# Patient Record
Sex: FEMALE | Race: WHITE | NOT HISPANIC OR LATINO
[De-identification: names, ages, dates, MRNs, and addresses within clinical notes are randomized per-mention and may not be internally consistent; named-entity substitution may affect disease eponyms.]

---

## 2021-08-09 PROBLEM — Z00.00 ENCOUNTER FOR PREVENTIVE HEALTH EXAMINATION: Status: ACTIVE | Noted: 2021-08-09

## 2021-08-13 ENCOUNTER — RESULT REVIEW (OUTPATIENT)
Age: 81
End: 2021-08-13

## 2021-08-25 DIAGNOSIS — I10 ESSENTIAL (PRIMARY) HYPERTENSION: ICD-10-CM

## 2021-08-25 DIAGNOSIS — Z87.891 PERSONAL HISTORY OF NICOTINE DEPENDENCE: ICD-10-CM

## 2021-08-25 DIAGNOSIS — E78.5 HYPERLIPIDEMIA, UNSPECIFIED: ICD-10-CM

## 2021-08-25 DIAGNOSIS — I50.32 CHRONIC DIASTOLIC (CONGESTIVE) HEART FAILURE: ICD-10-CM

## 2021-08-25 DIAGNOSIS — Z85.528 PERSONAL HISTORY OF OTHER MALIGNANT NEOPLASM OF KIDNEY: ICD-10-CM

## 2021-08-27 ENCOUNTER — APPOINTMENT (OUTPATIENT)
Dept: CARDIOTHORACIC SURGERY | Facility: CLINIC | Age: 81
End: 2021-08-27
Payer: MEDICARE

## 2021-08-27 ENCOUNTER — NON-APPOINTMENT (OUTPATIENT)
Age: 81
End: 2021-08-27

## 2021-08-27 VITALS
HEIGHT: 63 IN | WEIGHT: 216 LBS | SYSTOLIC BLOOD PRESSURE: 122 MMHG | DIASTOLIC BLOOD PRESSURE: 88 MMHG | OXYGEN SATURATION: 98 % | HEART RATE: 75 BPM | BODY MASS INDEX: 38.27 KG/M2

## 2021-08-27 PROCEDURE — 99204 OFFICE O/P NEW MOD 45 MIN: CPT

## 2021-08-27 PROCEDURE — 93000 ELECTROCARDIOGRAM COMPLETE: CPT

## 2021-08-31 NOTE — PHYSICAL EXAM
[General Appearance - Alert] : alert [General Appearance - In No Acute Distress] : in no acute distress [Sclera] : the sclera and conjunctiva were normal [Outer Ear] : the ears and nose were normal in appearance [Neck Appearance] : the appearance of the neck was normal [Jugular Venous Distention Increased] : there was no jugular-venous distention [] : no respiratory distress [Respiration, Rhythm And Depth] : normal respiratory rhythm and effort [Exaggerated Use Of Accessory Muscles For Inspiration] : no accessory muscle use [Auscultation Breath Sounds / Voice Sounds] : lungs were clear to auscultation bilaterally [Apical Impulse] : the apical impulse was normal [Heart Rate And Rhythm] : heart rate was normal and rhythm regular [Examination Of The Chest] : the chest was normal in appearance [Chest Visual Inspection Thoracic Asymmetry] : no chest asymmetry [Diminished Respiratory Excursion] : normal chest expansion [Bowel Sounds] : normal bowel sounds [Abdomen Soft] : soft [Abdomen Tenderness] : non-tender [Abnormal Walk] : normal gait [Skin Color & Pigmentation] : normal skin color and pigmentation [No Focal Deficits] : no focal deficits [Oriented To Time, Place, And Person] : oriented to person, place, and time [FreeTextEntry1] : Normal S1, diminished S2. LOYDA best heard at the [facility].

## 2021-08-31 NOTE — REVIEW OF SYSTEMS
[SOB on Exertion] : shortness of breath during exertion [Negative] : Neurological [Heart Rate Is Slow] : the heart rate was not slow [Heart Rate Is Fast] : the heart rate was not fast [Chest Pain] : no chest pain [Palpitations] : no palpitations [Leg Claudication] : no intermittent leg claudication [Lower Ext Edema] : no lower extremity edema [Shortness Of Breath] : no shortness of breath [Wheezing] : no wheezing [Cough] : no cough [Orthopnea] : no orthopnea [PND] : no PND

## 2021-08-31 NOTE — HISTORY OF PRESENT ILLNESS
[FreeTextEntry1] : \par 81 year old female who is a former smoker with a history of HTN, hyperlipidemia, prior gastric bypass, renal cell carcinoma s/p left nephrectomy and chronic diastolic heart failure with severe aortic stenosis who has been referred for surgical evaluation of her valvular heart disease. \par \par The patient reports new shortness of breath with exertion. She denies SOB at rest. The patient also denies chest pain, orthopnea, PND, dizziness, syncope, LE edema and palpitations. The patient had an ECHO in July that showed severe aortic stenosis with a mean gradient of 49 mmHg. She was then referred for cardiac catheterization that showed normal coronaries.

## 2021-09-13 ENCOUNTER — NON-APPOINTMENT (OUTPATIENT)
Age: 81
End: 2021-09-13

## 2021-09-13 ENCOUNTER — TRANSCRIPTION ENCOUNTER (OUTPATIENT)
Age: 81
End: 2021-09-13

## 2021-09-13 VITALS
SYSTOLIC BLOOD PRESSURE: 145 MMHG | HEART RATE: 87 BPM | DIASTOLIC BLOOD PRESSURE: 94 MMHG | HEIGHT: 63 IN | WEIGHT: 220.02 LBS | RESPIRATION RATE: 18 BRPM | TEMPERATURE: 98 F | OXYGEN SATURATION: 94 %

## 2021-09-14 ENCOUNTER — APPOINTMENT (OUTPATIENT)
Dept: CARDIOTHORACIC SURGERY | Facility: HOSPITAL | Age: 81
End: 2021-09-14

## 2021-09-14 ENCOUNTER — APPOINTMENT (OUTPATIENT)
Dept: CARDIOTHORACIC SURGERY | Facility: CLINIC | Age: 81
End: 2021-09-14
Payer: MEDICARE

## 2021-09-14 ENCOUNTER — NON-APPOINTMENT (OUTPATIENT)
Age: 81
End: 2021-09-14

## 2021-09-14 ENCOUNTER — INPATIENT (INPATIENT)
Facility: HOSPITAL | Age: 81
LOS: 0 days | Discharge: HOME HEALTH | DRG: 267 | End: 2021-09-15
Attending: INTERNAL MEDICINE | Admitting: INTERNAL MEDICINE
Payer: MEDICARE

## 2021-09-14 DIAGNOSIS — Z98.49 CATARACT EXTRACTION STATUS, UNSPECIFIED EYE: Chronic | ICD-10-CM

## 2021-09-14 DIAGNOSIS — Z90.5 ACQUIRED ABSENCE OF KIDNEY: Chronic | ICD-10-CM

## 2021-09-14 DIAGNOSIS — Z98.84 BARIATRIC SURGERY STATUS: Chronic | ICD-10-CM

## 2021-09-14 DIAGNOSIS — Z98.890 OTHER SPECIFIED POSTPROCEDURAL STATES: Chronic | ICD-10-CM

## 2021-09-14 DIAGNOSIS — Z96.651 PRESENCE OF RIGHT ARTIFICIAL KNEE JOINT: Chronic | ICD-10-CM

## 2021-09-14 LAB
ALBUMIN SERPL ELPH-MCNC: 3.5 G/DL — SIGNIFICANT CHANGE UP (ref 3.3–5)
ALBUMIN SERPL ELPH-MCNC: 3.7 G/DL — SIGNIFICANT CHANGE UP (ref 3.3–5)
ALBUMIN SERPL ELPH-MCNC: 4.2 G/DL — SIGNIFICANT CHANGE UP (ref 3.3–5)
ALP SERPL-CCNC: 67 U/L — SIGNIFICANT CHANGE UP (ref 40–120)
ALP SERPL-CCNC: 75 U/L — SIGNIFICANT CHANGE UP (ref 40–120)
ALP SERPL-CCNC: 85 U/L — SIGNIFICANT CHANGE UP (ref 40–120)
ALT FLD-CCNC: 14 U/L — SIGNIFICANT CHANGE UP (ref 10–45)
ALT FLD-CCNC: 18 U/L — SIGNIFICANT CHANGE UP (ref 10–45)
ALT FLD-CCNC: 20 U/L — SIGNIFICANT CHANGE UP (ref 10–45)
ANION GAP SERPL CALC-SCNC: 10 MMOL/L — SIGNIFICANT CHANGE UP (ref 5–17)
ANION GAP SERPL CALC-SCNC: 10 MMOL/L — SIGNIFICANT CHANGE UP (ref 5–17)
ANION GAP SERPL CALC-SCNC: 14 MMOL/L — SIGNIFICANT CHANGE UP (ref 5–17)
APTT BLD: 29.2 SEC — SIGNIFICANT CHANGE UP (ref 27.5–35.5)
APTT BLD: 29.5 SEC — SIGNIFICANT CHANGE UP (ref 27.5–35.5)
APTT BLD: 49.3 SEC — HIGH (ref 27.5–35.5)
AST SERPL-CCNC: 23 U/L — SIGNIFICANT CHANGE UP (ref 10–40)
AST SERPL-CCNC: 27 U/L — SIGNIFICANT CHANGE UP (ref 10–40)
AST SERPL-CCNC: 30 U/L — SIGNIFICANT CHANGE UP (ref 10–40)
BASOPHILS # BLD AUTO: 0.03 K/UL — SIGNIFICANT CHANGE UP (ref 0–0.2)
BASOPHILS NFR BLD AUTO: 0.7 % — SIGNIFICANT CHANGE UP (ref 0–2)
BILIRUB SERPL-MCNC: 0.3 MG/DL — SIGNIFICANT CHANGE UP (ref 0.2–1.2)
BILIRUB SERPL-MCNC: 0.3 MG/DL — SIGNIFICANT CHANGE UP (ref 0.2–1.2)
BILIRUB SERPL-MCNC: 0.4 MG/DL — SIGNIFICANT CHANGE UP (ref 0.2–1.2)
BLD GP AB SCN SERPL QL: POSITIVE — SIGNIFICANT CHANGE UP
BUN SERPL-MCNC: 18 MG/DL — SIGNIFICANT CHANGE UP (ref 7–23)
BUN SERPL-MCNC: 20 MG/DL — SIGNIFICANT CHANGE UP (ref 7–23)
BUN SERPL-MCNC: 20 MG/DL — SIGNIFICANT CHANGE UP (ref 7–23)
CALCIUM SERPL-MCNC: 8.7 MG/DL — SIGNIFICANT CHANGE UP (ref 8.4–10.5)
CALCIUM SERPL-MCNC: 9.3 MG/DL — SIGNIFICANT CHANGE UP (ref 8.4–10.5)
CALCIUM SERPL-MCNC: 9.9 MG/DL — SIGNIFICANT CHANGE UP (ref 8.4–10.5)
CHLORIDE SERPL-SCNC: 104 MMOL/L — SIGNIFICANT CHANGE UP (ref 96–108)
CHLORIDE SERPL-SCNC: 108 MMOL/L — SIGNIFICANT CHANGE UP (ref 96–108)
CHLORIDE SERPL-SCNC: 108 MMOL/L — SIGNIFICANT CHANGE UP (ref 96–108)
CO2 SERPL-SCNC: 23 MMOL/L — SIGNIFICANT CHANGE UP (ref 22–31)
CO2 SERPL-SCNC: 24 MMOL/L — SIGNIFICANT CHANGE UP (ref 22–31)
CO2 SERPL-SCNC: 24 MMOL/L — SIGNIFICANT CHANGE UP (ref 22–31)
CREAT SERPL-MCNC: 1.02 MG/DL — SIGNIFICANT CHANGE UP (ref 0.5–1.3)
CREAT SERPL-MCNC: 1.05 MG/DL — SIGNIFICANT CHANGE UP (ref 0.5–1.3)
CREAT SERPL-MCNC: 1.07 MG/DL — SIGNIFICANT CHANGE UP (ref 0.5–1.3)
EOSINOPHIL # BLD AUTO: 0.07 K/UL — SIGNIFICANT CHANGE UP (ref 0–0.5)
EOSINOPHIL NFR BLD AUTO: 1.7 % — SIGNIFICANT CHANGE UP (ref 0–6)
GAS PNL BLDA: SIGNIFICANT CHANGE UP
GLUCOSE BLDC GLUCOMTR-MCNC: 88 MG/DL — SIGNIFICANT CHANGE UP (ref 70–99)
GLUCOSE BLDC GLUCOMTR-MCNC: 90 MG/DL — SIGNIFICANT CHANGE UP (ref 70–99)
GLUCOSE BLDC GLUCOMTR-MCNC: 96 MG/DL — SIGNIFICANT CHANGE UP (ref 70–99)
GLUCOSE SERPL-MCNC: 104 MG/DL — HIGH (ref 70–99)
GLUCOSE SERPL-MCNC: 93 MG/DL — SIGNIFICANT CHANGE UP (ref 70–99)
GLUCOSE SERPL-MCNC: 98 MG/DL — SIGNIFICANT CHANGE UP (ref 70–99)
HCT VFR BLD CALC: 30.9 % — LOW (ref 34.5–45)
HCT VFR BLD CALC: 33.3 % — LOW (ref 34.5–45)
HCT VFR BLD CALC: 39.4 % — SIGNIFICANT CHANGE UP (ref 34.5–45)
HGB BLD-MCNC: 10.2 G/DL — LOW (ref 11.5–15.5)
HGB BLD-MCNC: 10.8 G/DL — LOW (ref 11.5–15.5)
HGB BLD-MCNC: 12.9 G/DL — SIGNIFICANT CHANGE UP (ref 11.5–15.5)
IMM GRANULOCYTES NFR BLD AUTO: 0.2 % — SIGNIFICANT CHANGE UP (ref 0–1.5)
INR BLD: 1.06 — SIGNIFICANT CHANGE UP (ref 0.88–1.16)
INR BLD: 1.11 — SIGNIFICANT CHANGE UP (ref 0.88–1.16)
INR BLD: 1.16 — SIGNIFICANT CHANGE UP (ref 0.88–1.16)
LYMPHOCYTES # BLD AUTO: 1.09 K/UL — SIGNIFICANT CHANGE UP (ref 1–3.3)
LYMPHOCYTES # BLD AUTO: 26.3 % — SIGNIFICANT CHANGE UP (ref 13–44)
MAGNESIUM SERPL-MCNC: 1.9 MG/DL — SIGNIFICANT CHANGE UP (ref 1.6–2.6)
MAGNESIUM SERPL-MCNC: 2.1 MG/DL — SIGNIFICANT CHANGE UP (ref 1.6–2.6)
MCHC RBC-ENTMCNC: 30.4 PG — SIGNIFICANT CHANGE UP (ref 27–34)
MCHC RBC-ENTMCNC: 31.3 PG — SIGNIFICANT CHANGE UP (ref 27–34)
MCHC RBC-ENTMCNC: 31.4 PG — SIGNIFICANT CHANGE UP (ref 27–34)
MCHC RBC-ENTMCNC: 32.4 GM/DL — SIGNIFICANT CHANGE UP (ref 32–36)
MCHC RBC-ENTMCNC: 32.7 GM/DL — SIGNIFICANT CHANGE UP (ref 32–36)
MCHC RBC-ENTMCNC: 33 GM/DL — SIGNIFICANT CHANGE UP (ref 32–36)
MCV RBC AUTO: 93.8 FL — SIGNIFICANT CHANGE UP (ref 80–100)
MCV RBC AUTO: 95.1 FL — SIGNIFICANT CHANGE UP (ref 80–100)
MCV RBC AUTO: 95.6 FL — SIGNIFICANT CHANGE UP (ref 80–100)
MONOCYTES # BLD AUTO: 0.28 K/UL — SIGNIFICANT CHANGE UP (ref 0–0.9)
MONOCYTES NFR BLD AUTO: 6.7 % — SIGNIFICANT CHANGE UP (ref 2–14)
NEUTROPHILS # BLD AUTO: 2.67 K/UL — SIGNIFICANT CHANGE UP (ref 1.8–7.4)
NEUTROPHILS NFR BLD AUTO: 64.4 % — SIGNIFICANT CHANGE UP (ref 43–77)
NRBC # BLD: 0 /100 WBCS — SIGNIFICANT CHANGE UP (ref 0–0)
PHOSPHATE SERPL-MCNC: 3.7 MG/DL — SIGNIFICANT CHANGE UP (ref 2.5–4.5)
PHOSPHATE SERPL-MCNC: 4 MG/DL — SIGNIFICANT CHANGE UP (ref 2.5–4.5)
PLATELET # BLD AUTO: 154 K/UL — SIGNIFICANT CHANGE UP (ref 150–400)
PLATELET # BLD AUTO: 177 K/UL — SIGNIFICANT CHANGE UP (ref 150–400)
PLATELET # BLD AUTO: 237 K/UL — SIGNIFICANT CHANGE UP (ref 150–400)
POTASSIUM SERPL-MCNC: 3.8 MMOL/L — SIGNIFICANT CHANGE UP (ref 3.5–5.3)
POTASSIUM SERPL-MCNC: 3.8 MMOL/L — SIGNIFICANT CHANGE UP (ref 3.5–5.3)
POTASSIUM SERPL-MCNC: 3.9 MMOL/L — SIGNIFICANT CHANGE UP (ref 3.5–5.3)
POTASSIUM SERPL-SCNC: 3.8 MMOL/L — SIGNIFICANT CHANGE UP (ref 3.5–5.3)
POTASSIUM SERPL-SCNC: 3.8 MMOL/L — SIGNIFICANT CHANGE UP (ref 3.5–5.3)
POTASSIUM SERPL-SCNC: 3.9 MMOL/L — SIGNIFICANT CHANGE UP (ref 3.5–5.3)
PROT SERPL-MCNC: 6 G/DL — SIGNIFICANT CHANGE UP (ref 6–8.3)
PROT SERPL-MCNC: 6.5 G/DL — SIGNIFICANT CHANGE UP (ref 6–8.3)
PROT SERPL-MCNC: 7.5 G/DL — SIGNIFICANT CHANGE UP (ref 6–8.3)
PROTHROM AB SERPL-ACNC: 12.7 SEC — SIGNIFICANT CHANGE UP (ref 10.6–13.6)
PROTHROM AB SERPL-ACNC: 13.3 SEC — SIGNIFICANT CHANGE UP (ref 10.6–13.6)
PROTHROM AB SERPL-ACNC: 13.8 SEC — HIGH (ref 10.6–13.6)
RBC # BLD: 3.25 M/UL — LOW (ref 3.8–5.2)
RBC # BLD: 3.55 M/UL — LOW (ref 3.8–5.2)
RBC # BLD: 4.12 M/UL — SIGNIFICANT CHANGE UP (ref 3.8–5.2)
RBC # FLD: 14 % — SIGNIFICANT CHANGE UP (ref 10.3–14.5)
RBC # FLD: 14.1 % — SIGNIFICANT CHANGE UP (ref 10.3–14.5)
RBC # FLD: 14.2 % — SIGNIFICANT CHANGE UP (ref 10.3–14.5)
RH IG SCN BLD-IMP: POSITIVE — SIGNIFICANT CHANGE UP
SODIUM SERPL-SCNC: 141 MMOL/L — SIGNIFICANT CHANGE UP (ref 135–145)
SODIUM SERPL-SCNC: 142 MMOL/L — SIGNIFICANT CHANGE UP (ref 135–145)
SODIUM SERPL-SCNC: 142 MMOL/L — SIGNIFICANT CHANGE UP (ref 135–145)
WBC # BLD: 4.15 K/UL — SIGNIFICANT CHANGE UP (ref 3.8–10.5)
WBC # BLD: 5.25 K/UL — SIGNIFICANT CHANGE UP (ref 3.8–10.5)
WBC # BLD: 5.29 K/UL — SIGNIFICANT CHANGE UP (ref 3.8–10.5)
WBC # FLD AUTO: 4.15 K/UL — SIGNIFICANT CHANGE UP (ref 3.8–10.5)
WBC # FLD AUTO: 5.25 K/UL — SIGNIFICANT CHANGE UP (ref 3.8–10.5)
WBC # FLD AUTO: 5.29 K/UL — SIGNIFICANT CHANGE UP (ref 3.8–10.5)

## 2021-09-14 PROCEDURE — 93308 TTE F-UP OR LMTD: CPT | Mod: 26

## 2021-09-14 PROCEDURE — 93010 ELECTROCARDIOGRAM REPORT: CPT

## 2021-09-14 PROCEDURE — 71045 X-RAY EXAM CHEST 1 VIEW: CPT | Mod: 26

## 2021-09-14 PROCEDURE — 86077 PHYS BLOOD BANK SERV XMATCH: CPT

## 2021-09-14 PROCEDURE — 99292 CRITICAL CARE ADDL 30 MIN: CPT

## 2021-09-14 PROCEDURE — 93010 ELECTROCARDIOGRAM REPORT: CPT | Mod: 77

## 2021-09-14 PROCEDURE — 33361 REPLACE AORTIC VALVE PERQ: CPT | Mod: 62,Q0

## 2021-09-14 PROCEDURE — 99291 CRITICAL CARE FIRST HOUR: CPT

## 2021-09-14 PROCEDURE — MCOT1: CPT | Mod: NC

## 2021-09-14 RX ORDER — MAGNESIUM SULFATE 500 MG/ML
2 VIAL (ML) INJECTION ONCE
Refills: 0 | Status: COMPLETED | OUTPATIENT
Start: 2021-09-14 | End: 2021-09-15

## 2021-09-14 RX ORDER — ALBUMIN HUMAN 25 %
250 VIAL (ML) INTRAVENOUS ONCE
Refills: 0 | Status: COMPLETED | OUTPATIENT
Start: 2021-09-14 | End: 2021-09-14

## 2021-09-14 RX ORDER — HEPARIN SODIUM 5000 [USP'U]/ML
7500 INJECTION INTRAVENOUS; SUBCUTANEOUS EVERY 8 HOURS
Refills: 0 | Status: DISCONTINUED | OUTPATIENT
Start: 2021-09-14 | End: 2021-09-15

## 2021-09-14 RX ORDER — DEXTROSE 50 % IN WATER 50 %
15 SYRINGE (ML) INTRAVENOUS ONCE
Refills: 0 | Status: DISCONTINUED | OUTPATIENT
Start: 2021-09-14 | End: 2021-09-15

## 2021-09-14 RX ORDER — ASPIRIN/CALCIUM CARB/MAGNESIUM 324 MG
81 TABLET ORAL DAILY
Refills: 0 | Status: DISCONTINUED | OUTPATIENT
Start: 2021-09-15 | End: 2021-09-15

## 2021-09-14 RX ORDER — DEXTROSE 50 % IN WATER 50 %
25 SYRINGE (ML) INTRAVENOUS ONCE
Refills: 0 | Status: DISCONTINUED | OUTPATIENT
Start: 2021-09-14 | End: 2021-09-15

## 2021-09-14 RX ORDER — DEXTROSE 50 % IN WATER 50 %
12.5 SYRINGE (ML) INTRAVENOUS ONCE
Refills: 0 | Status: DISCONTINUED | OUTPATIENT
Start: 2021-09-14 | End: 2021-09-15

## 2021-09-14 RX ORDER — CEFAZOLIN SODIUM 1 G
2000 VIAL (EA) INJECTION EVERY 8 HOURS
Refills: 0 | Status: DISCONTINUED | OUTPATIENT
Start: 2021-09-14 | End: 2021-09-15

## 2021-09-14 RX ORDER — POTASSIUM CHLORIDE 20 MEQ
20 PACKET (EA) ORAL ONCE
Refills: 0 | Status: COMPLETED | OUTPATIENT
Start: 2021-09-14 | End: 2021-09-14

## 2021-09-14 RX ORDER — CHLORHEXIDINE GLUCONATE 213 G/1000ML
5 SOLUTION TOPICAL
Refills: 0 | Status: DISCONTINUED | OUTPATIENT
Start: 2021-09-14 | End: 2021-09-15

## 2021-09-14 RX ORDER — SODIUM CHLORIDE 9 MG/ML
1000 INJECTION INTRAMUSCULAR; INTRAVENOUS; SUBCUTANEOUS
Refills: 0 | Status: DISCONTINUED | OUTPATIENT
Start: 2021-09-14 | End: 2021-09-15

## 2021-09-14 RX ORDER — PHENYLEPHRINE HYDROCHLORIDE 10 MG/ML
0.1 INJECTION INTRAVENOUS
Qty: 40 | Refills: 0 | Status: DISCONTINUED | OUTPATIENT
Start: 2021-09-14 | End: 2021-09-15

## 2021-09-14 RX ORDER — PANTOPRAZOLE SODIUM 20 MG/1
40 TABLET, DELAYED RELEASE ORAL DAILY
Refills: 0 | Status: DISCONTINUED | OUTPATIENT
Start: 2021-09-14 | End: 2021-09-15

## 2021-09-14 RX ORDER — SODIUM CHLORIDE 9 MG/ML
1000 INJECTION, SOLUTION INTRAVENOUS
Refills: 0 | Status: DISCONTINUED | OUTPATIENT
Start: 2021-09-14 | End: 2021-09-15

## 2021-09-14 RX ORDER — GLUCAGON INJECTION, SOLUTION 0.5 MG/.1ML
1 INJECTION, SOLUTION SUBCUTANEOUS ONCE
Refills: 0 | Status: DISCONTINUED | OUTPATIENT
Start: 2021-09-14 | End: 2021-09-15

## 2021-09-14 RX ORDER — ALBUMIN HUMAN 25 %
250 VIAL (ML) INTRAVENOUS
Refills: 0 | Status: COMPLETED | OUTPATIENT
Start: 2021-09-14 | End: 2021-09-14

## 2021-09-14 RX ORDER — INSULIN LISPRO 100/ML
VIAL (ML) SUBCUTANEOUS
Refills: 0 | Status: DISCONTINUED | OUTPATIENT
Start: 2021-09-14 | End: 2021-09-15

## 2021-09-14 RX ORDER — HEPARIN SODIUM 5000 [USP'U]/ML
5000 INJECTION INTRAVENOUS; SUBCUTANEOUS EVERY 8 HOURS
Refills: 0 | Status: DISCONTINUED | OUTPATIENT
Start: 2021-09-14 | End: 2021-09-14

## 2021-09-14 RX ORDER — ACETAMINOPHEN 500 MG
650 TABLET ORAL EVERY 6 HOURS
Refills: 0 | Status: DISCONTINUED | OUTPATIENT
Start: 2021-09-14 | End: 2021-09-15

## 2021-09-14 RX ORDER — MEPERIDINE HYDROCHLORIDE 50 MG/ML
25 INJECTION INTRAMUSCULAR; INTRAVENOUS; SUBCUTANEOUS ONCE
Refills: 0 | Status: DISCONTINUED | OUTPATIENT
Start: 2021-09-14 | End: 2021-09-14

## 2021-09-14 RX ADMIN — Medication 100 MILLIEQUIVALENT(S): at 16:11

## 2021-09-14 RX ADMIN — Medication 125 MILLILITER(S): at 19:20

## 2021-09-14 RX ADMIN — SODIUM CHLORIDE 10 MILLILITER(S): 9 INJECTION INTRAMUSCULAR; INTRAVENOUS; SUBCUTANEOUS at 14:15

## 2021-09-14 RX ADMIN — HEPARIN SODIUM 7500 UNIT(S): 5000 INJECTION INTRAVENOUS; SUBCUTANEOUS at 21:48

## 2021-09-14 RX ADMIN — Medication 650 MILLIGRAM(S): at 21:48

## 2021-09-14 RX ADMIN — Medication 250 MILLILITER(S): at 23:48

## 2021-09-14 RX ADMIN — Medication 125 MILLILITER(S): at 19:51

## 2021-09-14 RX ADMIN — PHENYLEPHRINE HYDROCHLORIDE 3.74 MICROGRAM(S)/KG/MIN: 10 INJECTION INTRAVENOUS at 23:50

## 2021-09-14 RX ADMIN — Medication 250 MILLILITER(S): at 23:51

## 2021-09-14 RX ADMIN — Medication 100 MILLIGRAM(S): at 20:10

## 2021-09-14 NOTE — H&P ADULT - ASSESSMENT
This 81 year -old female with PMH significant for HNT, HLD, prior gastric bypass, renal cell carcinoma with s/p left nephrectomy and chornic diastoilc CHF with severe aortic stenosis with associated symptoms of decrease of daily activities and shortness of breath presented to this admission for intervention for her valvular disease.  In the meantime, patient reports gradually decrease of activities, she denies fatigues, malaise, weight changes, syncopes, chest pain, shortness of breath, orthopnea, or cough.       Neurovascular: No delirium  - neurologic check per routine     Cardiovascular: aortic stenosis   - TAVR this admission   - pre op EKG  - ASA 81mg prior to the procedures  - pre op labs    Respiratory: 02 Sat = 98% on RA.  -If on oxygen wean to RA from for O2 Sat > 93%.  -Encourage C+DB and Use of IS 10x / hr while awake.  -CXR.    GI: s/p gastric bypass stable   - resume oral intake post op   - PPX.    Renal / : stable with Cr 1.06/BUN 20  -Monitor renal function.  -Monitor I/O's.    Endocrine:  stable  - monitor for A1c and TSH.    Hematologic: stable with H/H 12.9/39.4  - type and screen and hold blood products for procedures  - pre op lab    ID: afebrile with WBC of 5.29  -Tempature.  -CBC.  -Observe for SIRS/Sepsis Syndrome.    Prophylaxis:  - intraop heparin therapy   - post op DVT prophylaxis with 5000 SubQ Heparin q8h.  - post op SCD's    Disposition:  - TAVR at this admission   - post op cardiac step down for further care

## 2021-09-14 NOTE — H&P ADULT - NSICDXPASTMEDICALHX_GEN_ALL_CORE_FT
PAST MEDICAL HISTORY:  Diastolic CHF, acute     HLD (hyperlipidemia)     HTN (hypertension)     Severe aortic stenosis      PAST MEDICAL HISTORY:  Diastolic CHF, acute     HLD (hyperlipidemia)     HTN (hypertension)     Renal cell carcinoma     Severe aortic stenosis

## 2021-09-14 NOTE — H&P ADULT - NSHPLABSRESULTS_GEN_ALL_CORE
LABS:                        12.9   5.29  )-----------( 237      ( 14 Sep 2021 09:33 )             39.4     09-14    141  |  104  |  20  ----------------------------<  104<H>  3.8   |  23  |  1.05    Ca    9.3      14 Sep 2021 09:33  Phos  4.0     09-14  Mg     2.1     09-14    TPro  7.5  /  Alb  4.2  /  TBili  0.4  /  DBili  x   /  AST  27  /  ALT  20  /  AlkPhos  85  09-14    PT/INR - ( 14 Sep 2021 09:33 )   PT: 12.7 sec;   INR: 1.06          PTT - ( 14 Sep 2021 09:33 )  PTT:29.2 sec            RADIOLOGY & ADDITIONAL STUDIES:  CAROTID U/S:    CXR:    CT Scan:    EKG:    TTE / YUAN:    Cardiac Cath: LABS:                        12.9   5.29  )-----------( 237      ( 14 Sep 2021 09:33 )             39.4     09-14    141  |  104  |  20  ----------------------------<  104<H>  3.8   |  23  |  1.05    Ca    9.3      14 Sep 2021 09:33  Phos  4.0     09-14  Mg     2.1     09-14    TPro  7.5  /  Alb  4.2  /  TBili  0.4  /  DBili  x   /  AST  27  /  ALT  20  /  AlkPhos  85  09-14    PT/INR - ( 14 Sep 2021 09:33 )   PT: 12.7 sec;   INR: 1.06          PTT - ( 14 Sep 2021 09:33 )  PTT:29.2 sec    RADIOLOGY & ADDITIONAL STUDIES:  EKG: performed at bedside   echocardiogram: report in the cart and allscript   CT scan:

## 2021-09-14 NOTE — H&P ADULT - HISTORY OF PRESENT ILLNESS
Surgeon:    Requesting Physician:    HISTORY OF PRESENT ILLNESS:  81y Female    PAST MEDICAL & SURGICAL HISTORY:      MEDICATIONS  (STANDING):    MEDICATIONS  (PRN):      Allergies    desmopressin (Unknown)  imipramine (Unknown)  quinapril (Short breath (Mild))    Intolerances        SOCIAL HISTORY:  Smoker:  YES / NO        PACK YEARS:                         WHEN QUIT?  ETOH use:  YES / NO               FREQUENCY / QUANTITY:  Ilicit Drug use:  YES / NO  Occupation:  Assisted device use (Cane / Walker):  Live with:    FAMILY HISTORY:      Review of Systems  CONSTITUTIONAL:  Fevers / chills, sweats, fatigue, weight loss, weight gain                                    NEGATIVE  NEURO:  parathesias, seizures, syncope, confusion                                                                               NEGATIVE  EYES:  Blurry vision, discharge, pain, loss of vision                                                                                  NEGATIVE  ENMT:  Difficulty hearing, vertigo, dysphagia, epistaxis, recent dental work                                     NEGATIVE  CV:  Chest pain, palpitations, BARNES, orthopnea                                                                                         NEGATIVE  RESPIRATORY:  Wheezing, SOB, cough / sputum, hemoptysis                                                              NEGATIVE  GI:  Nausea, vommiting, diarrhea, constipation, melena                                                                        NEGATIVE  : Hematuria, dysuria, urgency, incontinence                                                                                       NEGATIVE  MUSKULOSKELETAL:  arthritis, joint swelling, muscle weakness                                                           NEGATIVE  SKIN/BREAST:  rash, itching, treva loss, masses                                                                                            NEGATIVE  PSYCH:  depresion, anxiety, suicidal ideation                                                                                             NEGATIVE  HEME/LYMPH:  bruises easily, enlarged lymph nodes, tender lymph nodes                                        NEGATIVE  ENDOCRINE:  cold intolerance, heat intolerance, polydipsia                                                                   NEGATIVE    PHYSICAL EXAM  Vital Signs Last 24 Hrs  T(C): 36.7 (13 Sep 2021 12:08), Max: 36.7 (13 Sep 2021 12:08)  T(F): 98.1 (13 Sep 2021 12:08), Max: 98.1 (13 Sep 2021 12:08)  HR: 87 (13 Sep 2021 12:08) (87 - 87)  BP: 145/94 (13 Sep 2021 12:08) (145/94 - 145/94)  BP(mean): --  RR: 18 (13 Sep 2021 12:08) (18 - 18)  SpO2: 94% (13 Sep 2021 12:08) (94% - 94%)    CONSTITUTIONAL:                                                                          WNL  NEURO:                                                                                             WNL                      EYES:                                                                                                  WNL  ENMT:                                                                                                WNL  CV:                                                                                                      WNL  RESPIRATORY:                                                                                  WNL  GI:                                                                                                       WNL  : SANDERS + / -                                                                                 WNL  MUSKULOSKELETAL:                                                                       WNL  SKIN / BREAST:                                                                                 WNL                                                          LABS:                        12.9   5.29  )-----------( 237      ( 14 Sep 2021 09:33 )             39.4     09-14    141  |  104  |  20  ----------------------------<  104<H>  3.8   |  23  |  1.05    Ca    9.3      14 Sep 2021 09:33  Phos  4.0     09-14  Mg     2.1     09-14    TPro  7.5  /  Alb  4.2  /  TBili  0.4  /  DBili  x   /  AST  27  /  ALT  20  /  AlkPhos  85  09-14    PT/INR - ( 14 Sep 2021 09:33 )   PT: 12.7 sec;   INR: 1.06          PTT - ( 14 Sep 2021 09:33 )  PTT:29.2 sec            RADIOLOGY & ADDITIONAL STUDIES:  CAROTID U/S:    CXR:    CT Scan:    EKG:    TTE / YUAN:    Cardiac Cath: This 81 year -old female with PMH significant for HNT, HLD, prior gastric bypass, renal cell carcinoma with s/p left nephrectomy and chornic diastoilc CHF with severe aortic stenosis with associated symptoms of decrease of daily activities and shortness of breath presented to this admission for intervention for her valvular disease.                                                                    This 81 year -old female with PMH significant for HNT, HLD, prior gastric bypass, renal cell carcinoma with s/p left nephrectomy and chornic diastoilc CHF with severe aortic stenosis with associated symptoms of decrease of daily activities and shortness of breath presented to this admission for intervention for her valvular disease.  In the meantime, patient reports gradually decrease of activities, she denies fatigues, malaise, weight changes, syncopes, chest pain, shortness of breath, orthopnea, or cough.       Upon arrival, patient is not in acute distress.  She is hemodynamically stable; she states LE edema is her normal state and she denies syncopes, headache, palpitation, chest pain, orthopnea or shortness of breath.

## 2021-09-14 NOTE — H&P ADULT - NSHPREVIEWOFSYSTEMS_GEN_ALL_CORE
CONSTITUTIONAL:  Fevers / chills, sweats, fatigue, weight loss, weight gain                                     NEURO:  parathesias, seizures, syncope, confusion                                                                                 EYES:  Blurry vision, discharge, pain, loss of vision                                                                                    ENMT:  Difficulty hearing, vertigo, dysphagia, epistaxis, recent dental work                                      CV:  Chest pain, palpitations, BARNES, orthopnea                                                                                           RESPIRATORY:  Wheezing, SOB, cough / sputum, hemoptysis                                                               GI:  Nausea, vommiting, diarrhea, constipation, melena                                                                         : Hematuria, dysuria, urgency, incontinence                                                                                         MUSKULOSKELETAL:  arthritis, joint swelling, muscle weakness                                                            SKIN/BREAST:  rash, itching, treva loss, masses                                                                                          PSYCH:  depresion, anxiety, suicidal ideation                                                                                               HEME/LYMPH:  bruises easily, enlarged lymph nodes, tender lymph nodes                                         ENDOCRINE:  cold intolerance, heat intolerance, polydipsia CONSTITUTIONAL: denies Fevers / chills, sweats, fatigue, weight loss, weight gain                                     NEURO: denies parathesias, seizures, syncope, confusion                                                                                 EYES: denies Blurry vision, discharge, pain, loss of vision                                                                                    ENMT: denies Difficulty hearing, vertigo, dysphagia, epistaxis, recent dental work                                      CV: positive for decrease of daily activities denies Chest pain, palpitations, BARNES, orthopnea                                                                                           RESPIRATORY: positive for SOB denies Wheezing, cough / sputum, hemoptysis                                                               GI: denies Nausea, vomiting, diarrhea, constipation, melena                                                                         : denies Hematuria, dysuria, urgency, incontinence                                                                                         MUSKULOSKELETAL: denies arthritis, joint swelling, muscle weakness                                                            SKIN/BREAST: denies rash, itching, hair loss, masses                                                                                          PSYCH: denies depression, anxiety, suicidal ideation                                                                                               HEME/LYMPH: denies bruises easily, enlarged lymph nodes, tender lymph nodes                                         ENDOCRINE: denies cold intolerance, heat intolerance, polydipsia

## 2021-09-14 NOTE — H&P ADULT - NSICDXPASTSURGICALHX_GEN_ALL_CORE_FT
PAST SURGICAL HISTORY:  S/P cataract surgery     S/P gastric bypass     S/P hernia repair     S/p nephrectomy     S/P total knee replacement, right

## 2021-09-14 NOTE — BRIEF OPERATIVE NOTE - COMMENTS
Dr. Zuniga was the first assistant for this case including but not limited to TAVR     I was present for this procedure and participated as first assistant as described by the PA above, unless otherwise noted below.

## 2021-09-14 NOTE — H&P ADULT - NSHPSOCIALHISTORY_GEN_ALL_CORE
Smoker: former smoker  ETOH use: no  Ilicit Drug use:  no  Occupation: retired/noncontributory   Assisted device use (Cane / Walker): n/a  Live with:

## 2021-09-14 NOTE — H&P ADULT - NSHPPHYSICALEXAM_GEN_ALL_CORE
T(C): 36.7 (13 Sep 2021 12:08), Max: 36.7 (13 Sep 2021 12:08)  T(F): 98.1 (13 Sep 2021 12:08), Max: 98.1 (13 Sep 2021 12:08)  HR: 87 (13 Sep 2021 12:08) (87 - 87)  BP: 145/94 (13 Sep 2021 12:08) (145/94 - 145/94)  BP(mean): --  RR: 18 (13 Sep 2021 12:08) (18 - 18)  SpO2: 94% (13 Sep 2021 12:08) (94% - 94%)    CONSTITUTIONAL:                                                                          WNL  NEURO:                                                                                             WNL                      EYES:                                                                                                  WNL  ENMT:                                                                                                WNL  CV:                                                                                                      WNL  RESPIRATORY:                                                                                  WNL  GI:                                                                                                       WNL  : SANDERS + / -                                                                                 WNL  MUSKULOSKELETAL:                                                                       WNL  SKIN / BREAST:                                                                                 WNL T(C): 36.7 (13 Sep 2021 12:08), Max: 36.7 (13 Sep 2021 12:08)  T(F): 98.1 (13 Sep 2021 12:08), Max: 98.1 (13 Sep 2021 12:08)  HR: 87 (13 Sep 2021 12:08) (87 - 87)  BP: 145/94 (13 Sep 2021 12:08) (145/94 - 145/94)  BP(mean): --  RR: 18 (13 Sep 2021 12:08) (18 - 18)  SpO2: 94% (13 Sep 2021 12:08) (94% - 94%)    CONSTITUTIONAL:   overweiht  NAD                                                                      NEURO:     grossly intact                                                                                                             EYES:       PERRL                                                                                             ENMT:        supple and no lymphadenopathy                                                                                           CV:    RRR with positive for murmur                                                                                                    RESPIRATORY:      no wheezing and no rhonchi                                                                              GI:    BM and BS are intact                                                                                                     : deferred                                                                                MUSKULOSKELETAL:     no deformity                                                                    SKIN / BREAST: warm

## 2021-09-15 ENCOUNTER — TRANSCRIPTION ENCOUNTER (OUTPATIENT)
Age: 81
End: 2021-09-15

## 2021-09-15 VITALS — TEMPERATURE: 99 F

## 2021-09-15 LAB
ALBUMIN SERPL ELPH-MCNC: 3.8 G/DL — SIGNIFICANT CHANGE UP (ref 3.3–5)
ALP SERPL-CCNC: 64 U/L — SIGNIFICANT CHANGE UP (ref 40–120)
ALT FLD-CCNC: 14 U/L — SIGNIFICANT CHANGE UP (ref 10–45)
ANION GAP SERPL CALC-SCNC: 10 MMOL/L — SIGNIFICANT CHANGE UP (ref 5–17)
APTT BLD: 29.7 SEC — SIGNIFICANT CHANGE UP (ref 27.5–35.5)
AST SERPL-CCNC: 23 U/L — SIGNIFICANT CHANGE UP (ref 10–40)
BILIRUB SERPL-MCNC: 0.4 MG/DL — SIGNIFICANT CHANGE UP (ref 0.2–1.2)
BUN SERPL-MCNC: 17 MG/DL — SIGNIFICANT CHANGE UP (ref 7–23)
CALCIUM SERPL-MCNC: 8.6 MG/DL — SIGNIFICANT CHANGE UP (ref 8.4–10.5)
CHLORIDE SERPL-SCNC: 110 MMOL/L — HIGH (ref 96–108)
CO2 SERPL-SCNC: 24 MMOL/L — SIGNIFICANT CHANGE UP (ref 22–31)
CREAT SERPL-MCNC: 1.05 MG/DL — SIGNIFICANT CHANGE UP (ref 0.5–1.3)
GAS PNL BLDA: SIGNIFICANT CHANGE UP
GLUCOSE BLDC GLUCOMTR-MCNC: 100 MG/DL — HIGH (ref 70–99)
GLUCOSE SERPL-MCNC: 103 MG/DL — HIGH (ref 70–99)
HCT VFR BLD CALC: 30.6 % — LOW (ref 34.5–45)
HGB BLD-MCNC: 10 G/DL — LOW (ref 11.5–15.5)
INR BLD: 1.1 — SIGNIFICANT CHANGE UP (ref 0.88–1.16)
MAGNESIUM SERPL-MCNC: 2.4 MG/DL — SIGNIFICANT CHANGE UP (ref 1.6–2.6)
MCHC RBC-ENTMCNC: 31 PG — SIGNIFICANT CHANGE UP (ref 27–34)
MCHC RBC-ENTMCNC: 32.7 GM/DL — SIGNIFICANT CHANGE UP (ref 32–36)
MCV RBC AUTO: 94.7 FL — SIGNIFICANT CHANGE UP (ref 80–100)
NRBC # BLD: 0 /100 WBCS — SIGNIFICANT CHANGE UP (ref 0–0)
PHOSPHATE SERPL-MCNC: 4 MG/DL — SIGNIFICANT CHANGE UP (ref 2.5–4.5)
PLATELET # BLD AUTO: 179 K/UL — SIGNIFICANT CHANGE UP (ref 150–400)
POTASSIUM SERPL-MCNC: 3.9 MMOL/L — SIGNIFICANT CHANGE UP (ref 3.5–5.3)
POTASSIUM SERPL-SCNC: 3.9 MMOL/L — SIGNIFICANT CHANGE UP (ref 3.5–5.3)
PROT SERPL-MCNC: 6.2 G/DL — SIGNIFICANT CHANGE UP (ref 6–8.3)
PROTHROM AB SERPL-ACNC: 13.1 SEC — SIGNIFICANT CHANGE UP (ref 10.6–13.6)
RBC # BLD: 3.23 M/UL — LOW (ref 3.8–5.2)
RBC # FLD: 14 % — SIGNIFICANT CHANGE UP (ref 10.3–14.5)
SODIUM SERPL-SCNC: 144 MMOL/L — SIGNIFICANT CHANGE UP (ref 135–145)
WBC # BLD: 6.4 K/UL — SIGNIFICANT CHANGE UP (ref 3.8–10.5)
WBC # FLD AUTO: 6.4 K/UL — SIGNIFICANT CHANGE UP (ref 3.8–10.5)

## 2021-09-15 PROCEDURE — 71045 X-RAY EXAM CHEST 1 VIEW: CPT | Mod: 26

## 2021-09-15 PROCEDURE — 99222 1ST HOSP IP/OBS MODERATE 55: CPT

## 2021-09-15 PROCEDURE — 93306 TTE W/DOPPLER COMPLETE: CPT | Mod: 26

## 2021-09-15 PROCEDURE — 93010 ELECTROCARDIOGRAM REPORT: CPT

## 2021-09-15 RX ORDER — POTASSIUM CHLORIDE 20 MEQ
20 PACKET (EA) ORAL ONCE
Refills: 0 | Status: COMPLETED | OUTPATIENT
Start: 2021-09-15 | End: 2021-09-15

## 2021-09-15 RX ORDER — TRIAMTERENE/HYDROCHLOROTHIAZID 75 MG-50MG
1 TABLET ORAL
Qty: 0 | Refills: 0 | DISCHARGE

## 2021-09-15 RX ORDER — ALBUMIN HUMAN 25 %
250 VIAL (ML) INTRAVENOUS ONCE
Refills: 0 | Status: COMPLETED | OUTPATIENT
Start: 2021-09-15 | End: 2021-09-15

## 2021-09-15 RX ORDER — ASPIRIN/CALCIUM CARB/MAGNESIUM 324 MG
1 TABLET ORAL
Qty: 30 | Refills: 0
Start: 2021-09-15 | End: 2021-10-14

## 2021-09-15 RX ORDER — ASPIRIN/CALCIUM CARB/MAGNESIUM 324 MG
1 TABLET ORAL
Qty: 0 | Refills: 0 | DISCHARGE

## 2021-09-15 RX ORDER — ACETAMINOPHEN 500 MG
2 TABLET ORAL
Qty: 0 | Refills: 0 | DISCHARGE
Start: 2021-09-15

## 2021-09-15 RX ADMIN — PANTOPRAZOLE SODIUM 40 MILLIGRAM(S): 20 TABLET, DELAYED RELEASE ORAL at 11:48

## 2021-09-15 RX ADMIN — Medication 50 GRAM(S): at 00:28

## 2021-09-15 RX ADMIN — Medication 100 MILLIEQUIVALENT(S): at 06:42

## 2021-09-15 RX ADMIN — Medication 650 MILLIGRAM(S): at 03:50

## 2021-09-15 RX ADMIN — HEPARIN SODIUM 7500 UNIT(S): 5000 INJECTION INTRAVENOUS; SUBCUTANEOUS at 06:33

## 2021-09-15 RX ADMIN — Medication 650 MILLIGRAM(S): at 06:29

## 2021-09-15 RX ADMIN — Medication 100 MILLIGRAM(S): at 05:00

## 2021-09-15 RX ADMIN — Medication 100 MILLIGRAM(S): at 11:48

## 2021-09-15 RX ADMIN — CHLORHEXIDINE GLUCONATE 5 MILLILITER(S): 213 SOLUTION TOPICAL at 06:32

## 2021-09-15 RX ADMIN — Medication 100 MILLIEQUIVALENT(S): at 00:28

## 2021-09-15 RX ADMIN — PHENYLEPHRINE HYDROCHLORIDE 3.74 MICROGRAM(S)/KG/MIN: 10 INJECTION INTRAVENOUS at 11:54

## 2021-09-15 RX ADMIN — Medication 125 MILLILITER(S): at 09:47

## 2021-09-15 RX ADMIN — Medication 650 MILLIGRAM(S): at 01:00

## 2021-09-15 RX ADMIN — Medication 125 MILLILITER(S): at 11:11

## 2021-09-15 RX ADMIN — HEPARIN SODIUM 7500 UNIT(S): 5000 INJECTION INTRAVENOUS; SUBCUTANEOUS at 14:59

## 2021-09-15 RX ADMIN — Medication 81 MILLIGRAM(S): at 11:48

## 2021-09-15 NOTE — DISCHARGE NOTE PROVIDER - HOSPITAL COURSE
80 y/o F with PMHx significant for HTN, HLD, prior gastric bypass, renal cell carcinoma with (s/p left nephrectomy) and chronic diastolic CHF with severe aortic stenosis with associated symptoms of decrease of daily activities and shortness of breath presented to this admission for intervention for her aortic valve disease. Patient presented today for TAVR. Her intraoperative course was uncomplicated and she was transferred directly to [address] [place] [address] ICU with TVP in place. Pt requiring intermittent low dose ranjith overnight into POD1 for hypotension, asymptomatic. POD1 remained with TVP in place in the morning for sinus bradycardia. EP consulted and cleared patient for discharge. TVP removed from the groin, no issues, sat up no issues. Ambulated no issues. Per Dr. Aleman, Dr. Zuniga and Dr. Bailey, pt is medically ready to be discharged home.     Over 35 minutes was spent with the patient reviewing the discharge material including medications, follow up appointments, recovery, concerning symptoms, and how to contact their health care providers if they have questions    Of note, pt will be discharged on ASA only

## 2021-09-15 NOTE — PROGRESS NOTE ADULT - SUBJECTIVE AND OBJECTIVE BOX
CTICU  CRITICAL  CARE  attending     Hand off received 					   Pertinent clinical, laboratory, radiographic, hemodynamic, echocardiographic, respiratory data, microbiologic data and chart were reviewed and analyzed frequently throughout the course of the day and night    This 81 year -old female with PMH significant for HNT, HLD, prior gastric bypass, renal cell carcinoma with s/p left nephrectomy and chronic  diastolic  CHF with severe aortic stenosis with associated symptoms of decrease of daily activities and shortness of breath presented to this admission for intervention for her valvular disease.  In the meantime, patient reports gradually decrease of activities, she denies fatigues, malaise, weight changes, syncopes, chest pain, shortness of breath, orthopnea, or cough.       S/P TAVR      FAMILY HISTORY:  PAST MEDICAL & SURGICAL HISTORY:  HTN (hypertension)    HLD (hyperlipidemia)    Diastolic CHF, acute    Severe aortic stenosis    Renal cell carcinoma    S/P cataract surgery    S/P gastric bypass    S/P hernia repair    S/P total knee replacement, right    S/p nephrectomy         14 system review was unremarkable    Vital signs, hemodynamic and respiratory parameters were reviewed from the bedside nursing flow sheet.  ICU Vital Signs Last 24 Hrs  T(C): 36.3 (14 Sep 2021 13:45), Max: 36.3 (14 Sep 2021 13:45)  T(F): 97.3 (14 Sep 2021 13:45), Max: 97.3 (14 Sep 2021 13:45)  HR: 80 (14 Sep 2021 21:00) (59 - 80)  BP: 127/56 (14 Sep 2021 21:00) (100/53 - 128/56)  BP(mean): 81 (14 Sep 2021 21:00) (72 - 81)  ABP: 100/42 (14 Sep 2021 21:00) (83/36 - 137/61)  ABP(mean): 61 (14 Sep 2021 21:00) (52 - 88)  RR: 20 (14 Sep 2021 20:55) (16 - 20)  SpO2: 94% (14 Sep 2021 21:00) (92% - 100%)    Adult Advanced Hemodynamics Last 24 Hrs  CVP(mm Hg): --  CVP(cm H2O): --  CO: --  CI: --  PA: --  PA(mean): --  PCWP: --  SVR: --  SVRI: --  PVR: --  PVRI: --, ABG - ( 14 Sep 2021 23:30 )  pH, Arterial: 7.43  pH, Blood: x     /  pCO2: 36    /  pO2: 82    / HCO3: 24    / Base Excess: -0.1  /  SaO2: 97.8                Intake and output was reviewed and the fluid balance was calculated  Daily     Daily   I&O's Summary    14 Sep 2021 07:01  -  14 Sep 2021 23:55  --------------------------------------------------------  IN: 710 mL / OUT: 500 mL / NET: 210 mL        All lines and drain sites were assessed    Neuro: No change in the mental status from the baseline. Moves all 4 extremities.  Neck: No JVD.  CVS: S1, S2, No S3.  Lungs: Good air entry bilaterally.  Abd: Soft. No tenderness. + Bowel sounds.  Vascular: + DP/PT.  Extremities: Lowe Extremity  edema.  Lymphatic: Normal.  Skin: No abnormalities.      labs  CBC Full  -  ( 14 Sep 2021 23:16 )  WBC Count : 5.25 K/uL  RBC Count : 3.25 M/uL  Hemoglobin : 10.2 g/dL  Hematocrit : 30.9 %  Platelet Count - Automated : 154 K/uL  Mean Cell Volume : 95.1 fl  Mean Cell Hemoglobin : 31.4 pg  Mean Cell Hemoglobin Concentration : 33.0 gm/dL  Auto Neutrophil # : x  Auto Lymphocyte # : x  Auto Monocyte # : x  Auto Eosinophil # : x  Auto Basophil # : x  Auto Neutrophil % : x  Auto Lymphocyte % : x  Auto Monocyte % : x  Auto Eosinophil % : x  Auto Basophil % : x    09-14    142  |  108  |  20  ----------------------------<  93  3.9   |  24  |  1.07    Ca    8.7      14 Sep 2021 23:16  Phos  3.7     09-14  Mg     1.9     09-14    TPro  6.0  /  Alb  3.5  /  TBili  0.3  /  DBili  x   /  AST  23  /  ALT  14  /  AlkPhos  67  09-14    PT/INR - ( 14 Sep 2021 23:16 )   PT: 13.3 sec;   INR: 1.11          PTT - ( 14 Sep 2021 23:16 )  PTT:29.5 sec  The current medications were reviewed   MEDICATIONS  (STANDING):  ceFAZolin   IVPB 2000 milliGRAM(s) IV Intermittent every 8 hours  chlorhexidine 0.12% Liquid 5 milliLiter(s) Oral Mucosa two times a day  dextrose 40% Gel 15 Gram(s) Oral once  dextrose 5%. 1000 milliLiter(s) (50 mL/Hr) IV Continuous <Continuous>  dextrose 5%. 1000 milliLiter(s) (100 mL/Hr) IV Continuous <Continuous>  dextrose 50% Injectable 25 Gram(s) IV Push once  dextrose 50% Injectable 12.5 Gram(s) IV Push once  dextrose 50% Injectable 25 Gram(s) IV Push once  glucagon  Injectable 1 milliGRAM(s) IntraMuscular once  heparin   Injectable 7500 Unit(s) SubCutaneous every 8 hours  insulin lispro (ADMELOG) corrective regimen sliding scale   SubCutaneous Before meals and at bedtime  magnesium sulfate  IVPB 2 Gram(s) IV Intermittent once  pantoprazole  Injectable 40 milliGRAM(s) IV Push daily  phenylephrine    Infusion 0.1 MICROgram(s)/kG/Min (3.74 mL/Hr) IV Continuous <Continuous>  potassium chloride  20 mEq/100 mL IVPB 20 milliEquivalent(s) IV Intermittent once  sodium chloride 0.9%. 1000 milliLiter(s) (10 mL/Hr) IV Continuous <Continuous>    MEDICATIONS  (PRN):  acetaminophen   Tablet .. 650 milliGRAM(s) Oral every 6 hours PRN Mild Pain (1 - 3)        S/P TAVR  Second degree AV block  VVI pacing at 80 BPM.  Hemodynamically stable.  Good oxygenation.  Fair urine out put.        My plan includes :  Schedule for permanent pacemaker.   Statin Rx.  PO afterload reduction.  Hold Betablocker.  Close hemodynamic, ventilatory and drain monitoring and management  Monitor for arrhythmias and monitor parameters for organ perfusion  Monitor neurologic status  Monitor renal function.  Head of the bed should remain elevated to 45 deg .   Chest PT and IS will be encouraged  Monitor adequacy of oxygenation and ventilation and attempt to wean oxygen  Nutritional goals will be met using po eventually , ensure adequate caloric intake and monitor the same  Stress ulcer and VTE prophylaxis will be achieved    Glycemic control is satisfactory  Electrolytes have been repleted as necessary and wound care has been carried out. Pain control has been achieved.   Aggressive physical therapy and early mobility and ambulation goals will be met   The family was updated about the course and plan  CRITICAL CARE TIME SPENT in evaluation and management, reassessments, review and interpretation of labs and x-rays, ventilator and hemodynamic management, formulating a plan and coordinating care: ___90____ MIN.  Time does not include procedural time.  CTICU ATTENDING     					    Cole Cisneros MD                        	
Patient discussed on morning rounds with Dr. Zuniga and Dr. Aleman     Operation / Date: TAVR Jori valve 9/14/21     Surgeon: Dr. Zacarias Mae & Dr. Zuniga     Referring Physician: Dr. Adames     SUBJECTIVE ASSESSMENT  Pt is feeling well and looking forward to going home. Eating/drinking well. Moving bowels regularly and passing gas regularly. Ambulated well without dizziness. Denies any CP, palpitations, SOB, wheezing, abd pain, n/v/d/c ,fevers or chills.     HOSPITAL COURSE:  80 y/o F with PMHx significant for HTN, HLD, prior gastric bypass, renal cell carcinoma with (s/p left nephrectomy) and chronic diastolic CHF with severe aortic stenosis with associated symptoms of decrease of daily activities and shortness of breath presented to this admission for intervention for her aortic valve disease. Patient presented today for TAVR. Her intraoperative course was uncomplicated and she was transferred directly to [address] [place] [address] ICU with TVP in place. Pt requiring intermittent low dose ranjith overnight into POD1 for hypotension, asymptomatic. POD1 remained with TVP in place in the morning for sinus bradycardia. EP consulted and cleared patient for discharge. TVP removed from the groin, no issues, sat up no issues. Ambulated no issues. Per Dr. Aleman, Dr. Zuniga and Dr. Bailey, pt is medically ready to be discharged home.     Over 35 minutes was spent with the patient reviewing the discharge material including medications, follow up appointments, recovery, concerning symptoms, and how to contact their health care providers if they have questions    Of note, pt will be discharged on ASA only       Vital Signs Last 24 Hrs  T(C): 37.2 (15 Sep 2021 14:19), Max: 37.2 (15 Sep 2021 14:19)  T(F): 99 (15 Sep 2021 14:19), Max: 99 (15 Sep 2021 14:19)  HR: 63 (15 Sep 2021 16:00) (57 - 80)  BP: 101/49 (15 Sep 2021 16:00) (99/48 - 128/60)  BP(mean): 71 (15 Sep 2021 16:00) (66 - 86)  RR: 14 (15 Sep 2021 16:00) (14 - 23)  SpO2: 94% (15 Sep 2021 16:00) (90% - 100%)    EPICARDIAL WIRES REMOVED: n/a  TIE DOWNS REMOVED: n/a    PHYSICAL EXAM:  General: well appearing sitting in chair in NAD   Neurological: AOx3. Motor skills grossly intact  Cardiovascular: Normal S1/S2. Regular rate/rhythm. +systolic murmur   Respiratory: Lungs CTA bilaterally. No wheezing or rales  Gastrointestinal: +BS in all 4 quadrants. Non-distended. Soft. Non-tender  Extremities: Strength 5/5 b/l upper/lower extremities. Sensation grossly intact upper/lower extremities. No edema. No calf tenderness.  Vascular: Radial 2+bilaterally, DP 2+ b/l  Incision Sites: b// groin incisions without erythema, purulence or ecchymosis.     LABS:                        10.0   6.40  )-----------( 179      ( 15 Sep 2021 03:31 )             30.6     COUMADIN:  no    PT/INR - ( 15 Sep 2021 03:31 )   PT: 13.1 sec;   INR: 1.10     PTT - ( 15 Sep 2021 03:31 )  PTT:29.7 sec    09-15    144  |  110<H>  |  17  ----------------------------<  103<H>  3.9   |  24  |  1.05    Ca    8.6      15 Sep 2021 03:31  Phos  4.0     09-15  Mg     2.4     09-15    TPro  6.2  /  Alb  3.8  /  TBili  0.4  /  DBili  x   /  AST  23  /  ALT  14  /  AlkPhos  64  09-15    Discharge CXR:  < from: Xray Chest 1 View- PORTABLE-Routine (Xray Chest 1 View- PORTABLE-Routine in AM.) (09.15.21 @ 05:15) >  IMPRESSION: No significant interval changes.  < end of copied text >    Discharge ECHO:  < from: TTE Echo Complete w/ Contrast w/ Doppler (09.15.21 @ 10:25) >  CONCLUSIONS:   1. Normal left and right ventricular size and systolic function.   2. Hyperdynamic left ventricular systolic function.   3. Normal right ventricular size and systolic function.   4. Biatrial enlargement.   5. 23 mm Jori 3 Ultra valve is seen in the aortic position with apparent normal function, without evidence of prosthetic dysfunction. There is noaortic regurgitation. The peak transvalvular velocity is 3.14 m/s, the mean transvalvular gradient is 20.10 mmHg, and the LVOT/AV velocity ratio is 0.60.   6. Mild mitral regurgitation.   7. Severe tricuspid regurgitation.   8. Pulmonary hypertension present, pulmonary artery systolic pressure is 54 mmHg.   9. No pericardial effusion.  10. No prior echo is available for comparison.  < end of copied text >    
Patient seen at bedside post procedure    Operation / Date: TF TAVR (26mm Jori) ; 9/14/2021    SUBJECTIVE ASSESSMENT:  81y Female seen at bedside after arrival from OR. She states she feels well. Denies fever, chills, CP, SOB, HA, dizziness. No pain at the groins      Vital Signs Last 24 Hrs  T(C): 36.3 (14 Sep 2021 13:45), Max: 36.3 (14 Sep 2021 13:45)  T(F): 97.3 (14 Sep 2021 13:45), Max: 97.3 (14 Sep 2021 13:45)  HR: 73 (14 Sep 2021 13:45) (73 - 73)  BP: --  BP(mean): --  RR: 18 (14 Sep 2021 13:45) (18 - 18)  SpO2: 100% (14 Sep 2021 13:45) (100% - 100%)  I&O's Detail      CHEST TUBE:  No  DANIELLE DRAIN:  No  EPICARDIAL WIRES: TVP  TIE DOWNS: No  SANDERS: No    PHYSICAL EXAM:    Gen: NAD, laying bed, non-toxic appearing   Neuro: AOx3, following commands   Cardiac: RRR, S1S2, no murmur noted   Resp: CTAB, no wheeze noted   Abd: Soft, NT, ND, +BS  Ext: WWP, no LE edema noted b/l   Vascular: Pulses present and equal throughout   Incisions: Bilateral groins without masses noted. Left TVP in place     LABS:                        10.8   4.15  )-----------( 177      ( 14 Sep 2021 14:05 )             33.3       COUMADIN:  No    PT/INR - ( 14 Sep 2021 09:33 )   PT: 12.7 sec;   INR: 1.06          PTT - ( 14 Sep 2021 09:33 )  PTT:29.2 sec    09-14    141  |  104  |  20  ----------------------------<  104<H>  3.8   |  23  |  1.05    Ca    9.3      14 Sep 2021 09:33  Phos  4.0     09-14  Mg     2.1     09-14    TPro  7.5  /  Alb  4.2  /  TBili  0.4  /  DBili  x   /  AST  27  /  ALT  20  /  AlkPhos  85  09-14        MEDICATIONS  (STANDING):  ceFAZolin   IVPB 2000 milliGRAM(s) IV Intermittent every 8 hours  chlorhexidine 0.12% Liquid 5 milliLiter(s) Oral Mucosa two times a day  dextrose 40% Gel 15 Gram(s) Oral once  dextrose 5%. 1000 milliLiter(s) (50 mL/Hr) IV Continuous <Continuous>  dextrose 5%. 1000 milliLiter(s) (100 mL/Hr) IV Continuous <Continuous>  dextrose 50% Injectable 25 Gram(s) IV Push once  dextrose 50% Injectable 12.5 Gram(s) IV Push once  dextrose 50% Injectable 25 Gram(s) IV Push once  glucagon  Injectable 1 milliGRAM(s) IntraMuscular once  heparin   Injectable 5000 Unit(s) SubCutaneous every 8 hours  insulin lispro (ADMELOG) corrective regimen sliding scale   SubCutaneous Before meals and at bedtime  meperidine     Injectable 25 milliGRAM(s) IV Push once  pantoprazole  Injectable 40 milliGRAM(s) IV Push daily  sodium chloride 0.9%. 1000 milliLiter(s) (10 mL/Hr) IV Continuous <Continuous>    MEDICATIONS  (PRN):        RADIOLOGY & ADDITIONAL TESTS:

## 2021-09-15 NOTE — PROGRESS NOTE ADULT - ASSESSMENT
D/C Instructions:   Kelli Adames)  Cardiovascular Disease; Internal Medicine; Interventional Cardiology  [address]  [place] [address]  Phone: [phone]  Fax: [phone]  Follow Up Time:     Silverio Zuniga)  Cardiovascular Surgery  [address] Floor, [place] [address]  Phone: [phone]  Fax: [phone]  Follow Up Time:    -Please attend your discharge appointments.    DASH Diet    No heavy lifting/straining, Showering allowed, Stairs allowed, Walking - Indoors allowed, Walking - Outdoors allowed'    -You had a MCOT monitor (an external cardiac rhythm monitoring device) placed on your day of discharge.  This helps us monitor your heart while you are out of the hospital for 30 days after discharge. Should your heart go into an abnormal or dangerous rhythm you will receieve a call from the MCOT team and your Structural Heart team of Doctors and PA's will be notified.    1. Keep the monitor within 30 feet of you at all times.  2. When you feel any symptom (chest pain, dizziness, palpitations, weakness, fatigue or anything outside of your normal), press the “Record Symptoms” button on the main phone of your phone  3. Shower or exercise as normal whilewearing the MCOT Patch. Do not swim or take a bath. Patch is water-resistant, not waterproof  4. When the battery is low on the phone or on the device, use the supplied . The monitor will show a warning message when the battery is low.  5. Do not remove the patch from yourskin after you begin monitoring. With normal wear, each patch should last 5 days. To replace the patch follow instructions in the MCOT box with the Patch Guide  6. Any issues with the MCOT device or phone please call Customer Service at [phone].  7. If you have any other questions at all please call the Structural Heart office at [phone]    -Walk daily as tolerated and use your incentive spirometer 10 times every hour while you are awake.     -Please weigh yourself daily. If you notice over a 3 pound weight gain in 3 days, this is a sign you are likely retaining too much fluid. It is imperative you call our right away with unexplained rapid weight gain.      -Please continue to wear the compression stockings given to you in the hospital at home. This is a way to prevent fluid from building up in your legs.     -No driving or strenuous activity/exercise until cleared by your surgeon.    -Gently clean your incisions with unscented/antibacterial soap and water, pat dry.  You may leave them open to air.    -Call your doctor if you have shortness of breath, chest pain not relieved by pain medication, dizziness, fever >101.5, or increased redness or drainage from incisions.  
81 year -old female with PMH significant for HTN, HLD, prior gastric bypass, renal cell carcinoma with s/p left nephrectomy and chornic diastoilc CHF with severe aortic stenosis with associated symptoms of decrease of daily activities and shortness of breath presented to this admission for intervention for her valvular disease. Patient presented today for TAVR. Her intraoperative course was uncomplicated and she was transferred directly to [facility] ICU with TVP in place. She is currently stable.     Plan:   ==== Neurovascular ====  -No delirium, pain well managed on current regimen  -C/w PRNs for Pain control  -Monitor neuro status    ==== Respiratory ====  -Saturates well on RA     -Postop CXR stable   -Encourage IS 10x/hour while awake, Cough and deep breathing exercises  -Monitor respiratory status via SpO2    ==== Cardiovascular ====  Severe AS   -S/p TAVR  -continue with ASA  -MCOT prior to discharge tomorrow   -TTE and EKG tomorrow afternoon  -EKG this afternoon, d/c TVP if OK   HTN  -Start PO meds when tolerates   Chronic diastolic HF  -Stable     ==== GI ====   -Tolerating PO  -Prophylaxis: Protonix  -C/w bowel regimen    ==== /Renal ====  -BUN/Cr: Pending   -H/o RCC s/p left nephrectomy, monitor BUN/Cr  -Trend Cr on AM labs  -Replete electrolytes as needed    ==== ID ====   Afebrile, asymptomatic  -WBC: 4.15  -Continue to monitor for SIRS/Sepsis syndrome while inpatient    ==== Endocrine ====   -No h/o DM/thyroid disease     ==== Hematologic ====   -H/H: 10.8/33.3  -CBC, chem in AM  -DVT ppx: HSQ 5000 u q8h and SCDs    ==== Disposition Planning ====  Home when medically appropriate, tomorrow at 10am

## 2021-09-15 NOTE — DISCHARGE NOTE PROVIDER - NSDCMRMEDTOKEN_GEN_ALL_CORE_FT
aspirin 81 mg oral delayed release tablet: 1 tab(s) orally once a day  triamterene-hydrochlorothiazide 37.5 mg-25 mg oral capsule: 1 cap(s) orally once a day   acetaminophen 325 mg oral tablet: 2 tab(s) orally every 6 hours, As needed, Mild Pain (1 - 3)  aspirin 81 mg oral delayed release tablet: 1 tab(s) orally once a day

## 2021-09-15 NOTE — DISCHARGE NOTE PROVIDER - NSDCFUADDINST_GEN_ALL_CORE_FT
-You had a MCOT monitor (an external cardiac rhythm monitoring device) placed on your day of discharge.  This helps us monitor your heart while you are out of the hospital for 30 days after discharge. Should your heart go into an abnormal or dangerous rhythm you will receieve a call from the MCOT team and your Structural Heart team of Doctors and PA's will be notified.    1. Keep the monitor within 30 feet of you at all times.  2. When you feel any symptom (chest pain, dizziness, palpitations, weakness, fatigue or anything outside of your normal), press the “Record Symptoms” button on the main phone of your phone  3. Shower or exercise as normal whilewearing the MCOT Patch. Do not swim or take a bath. Patch is water-resistant, not waterproof  4. When the battery is low on the phone or on the device, use the supplied . The monitor will show a warning message when the battery is low.  5. Do not remove the patch from yourskin after you begin monitoring. With normal wear, each patch should last 5 days. To replace the patch follow instructions in the MCOT box with the Patch Guide  6. Any issues with the MCOT device or phone please call Customer Service at [phone].  7. If you have any other questions at all please call the Structural Heart office at [phone]    -Walk daily as tolerated and use your incentive spirometer 10 times every hour while you are awake.     -Please weigh yourself daily. If you notice over a 3 pound weight gain in 3 days, this is a sign you are likely retaining too much fluid. It is imperative you call our right away with unexplained rapid weight gain.      -Please continue to wear the compression stockings given to you in the hospital at home. This is a way to prevent fluid from building up in your legs.     -No driving or strenuous activity/exercise until cleared by your surgeon.    -Gently clean your incisions with unscented/antibacterial soap and water, pat dry.  You may leave them open to air.    -Call your doctor if you have shortness of breath, chest pain not relieved by pain medication, dizziness, fever >101.5, or increased redness or drainage from incisions.   -You had a MCOT monitor (an external cardiac rhythm monitoring device) placed on your day of discharge.  This helps us monitor your heart while you are out of the hospital for 30 days after discharge. Should your heart go into an abnormal or dangerous rhythm you will receieve a call from the MCOT team and your Structural Heart team of Doctors and PA's will be notified.    1. Keep the monitor within 30 feet of you at all times.  2. When you feel any symptom (chest pain, dizziness, palpitations, weakness, fatigue or anything outside of your normal), press the “Record Symptoms” button on the main phone of your phone  3. Shower or exercise as normal whilewearing the MCOT Patch. Do not swim or take a bath. Patch is water-resistant, not waterproof  4. When the battery is low on the phone or on the device, use the supplied . The monitor will show a warning message when the battery is low.  5. Do not remove the patch from yourskin after you begin monitoring. With normal wear, each patch should last 5 days. To replace the patch follow instructions in the MCOT box with the Patch Guide  6. Any issues with the MCOT device or phone please call Customer Service at [phone].  7. If you have any other questions at all please call the Structural Heart office at [phone]    -Walk daily as tolerated and use your incentive spirometer 10 times every hour while you are awake.     -Please weigh yourself daily. If you notice over a 3 pound weight gain in 3 days, this is a sign you are likely retaining too much fluid. It is imperative you call our right away with unexplained rapid weight gain.      -Please continue to wear the compression stockings given to you in the hospital at home. This is a way to prevent fluid from building up in your legs.     -No driving or strenuous activity/exercise until cleared by your surgeon.    -Gently clean your incisions with unscented/antibacterial soap and water, pat dry.  You may leave them open to air.    -Call your doctor if you have shortness of breath, chest pain not relieved by pain medication, dizziness, fever >101.5, or increased redness or drainage from incisions.    -You blood pressure was low when you were admitted to the hospital and when you leave please do not take your blood pressure pill. We will follow up in the office regarding restarting this medication once your blood pressure is back to normal. This is normal and can happen after you have procedure.

## 2021-09-15 NOTE — PHYSICAL THERAPY INITIAL EVALUATION ADULT - PERTINENT HX OF CURRENT PROBLEM, REHAB EVAL
81 year -old female with PMH significant for HTN, HLD, prior gastric bypass, renal cell carcinoma with s/p left nephrectomy and chornic diastoilc CHF with severe aortic stenosis with associated symptoms of decrease of daily activities and shortness of breath presented to this admission for intervention for her valvular disease. Patient presented today for TAVR. Her intraoperative course was uncomplicated and she was transferred directly to [facility] ICU with TVP in place. She is currently stable.

## 2021-09-15 NOTE — CONSULT NOTE ADULT - ASSESSMENT
82 yo F with history of  HTN, HLD, prior gastric bypass, renal cell carcinoma with s/p left nephrectomy and chornic diastoilc CHF with severe aortic stenosis who had TVR with Jori valve 9/14/21. Patient was noted to be bradycardic on telemetry monitoring. Telemetry review showed sinus bradycardia, no ECG changes. Agree with TVP removal , would continue telemetry, will continue to monitor telemetry.

## 2021-09-15 NOTE — DISCHARGE NOTE NURSING/CASE MANAGEMENT/SOCIAL WORK - PATIENT PORTAL LINK FT
You can access the FollowMyHealth Patient Portal offered by [facility] by registering at the following website: [email or web address]. By joining [employer]’s FollowMyHealth portal, you will also be able to view your health information using other applications (apps) compatible with our system.

## 2021-09-15 NOTE — CONSULT NOTE ADULT - SUBJECTIVE AND OBJECTIVE BOX
Electrophysiology Consult Note:     CHIEF COMPLAINT:  Patient is a 81y old  Female who presents with a chief complaint of Aortic stenosis (14 Sep 2021 23:54)        HISTORY OF PRESENT ILLNESS:   HPI:  80 yo F with history of  HTN, HLD, prior gastric bypass, renal cell carcinoma with s/p left nephrectomy and chornic diastoilc CHF with severe aortic stenosis who had TVR with Jori valve 9/14/21. Patient was  noted to be bradycardic on telemetry monitoring.  EPS called to evaluate         PAST MEDICAL & SURGICAL HISTORY:  HTN (hypertension)    HLD (hyperlipidemia)    Diastolic CHF, acute    Severe aortic stenosis    Renal cell carcinoma    S/P cataract surgery    S/P gastric bypass    S/P hernia repair    S/P total knee replacement, right    S/p nephrectomy        FAMILY HISTORY:      SOCIAL HISTORY:    [x ] Non-smoker      Allergies    desmopressin (Unknown)  imipramine (Unknown)  quinapril (Short breath (Mild))    Intolerances    	    MEDICATIONS:  MEDICATIONS  (STANDING):  aspirin enteric coated 81 milliGRAM(s) Oral daily  ceFAZolin   IVPB 2000 milliGRAM(s) IV Intermittent every 8 hours  chlorhexidine 0.12% Liquid 5 milliLiter(s) Oral Mucosa two times a day  dextrose 40% Gel 15 Gram(s) Oral once  dextrose 5%. 1000 milliLiter(s) (50 mL/Hr) IV Continuous <Continuous>  dextrose 5%. 1000 milliLiter(s) (100 mL/Hr) IV Continuous <Continuous>  dextrose 50% Injectable 25 Gram(s) IV Push once  dextrose 50% Injectable 12.5 Gram(s) IV Push once  dextrose 50% Injectable 25 Gram(s) IV Push once  glucagon  Injectable 1 milliGRAM(s) IntraMuscular once  heparin   Injectable 7500 Unit(s) SubCutaneous every 8 hours  insulin lispro (ADMELOG) corrective regimen sliding scale   SubCutaneous Before meals and at bedtime  pantoprazole  Injectable 40 milliGRAM(s) IV Push daily  phenylephrine    Infusion 0.1 MICROgram(s)/kG/Min (3.74 mL/Hr) IV Continuous <Continuous>  sodium chloride 0.9%. 1000 milliLiter(s) (10 mL/Hr) IV Continuous <Continuous>    MEDICATIONS  (PRN):  acetaminophen   Tablet .. 650 milliGRAM(s) Oral every 6 hours PRN Mild Pain (1 - 3)        REVIEW OF SYSTEMS:  CONSTITUTIONAL: No fever, weight loss, or fatigue  EYES: No eye pain, visual disturbances, or discharge  ENMT:  No difficulty hearing, tinnitus, vertigo; No sinus or throat pain  NECK: No pain or stiffness  BREASTS: No pain, masses, or nipple discharge  RESPIRATORY: No cough, wheezing, chills or hemoptysis; No Shortness of Breath  CARDIOVASCULAR: No chest pain, palpitations, dizziness, or leg swelling  GASTROINTESTINAL: No abdominal or epigastric pain. No nausea, vomiting, or hematemesis; No diarrhea or constipation.   GENITOURINARY: No dysuria, frequency, hematuria, or incontinence  NEUROLOGICAL: No headaches, memory loss, loss of strength, numbness, or tremors  SKIN: No itching, burning, rashes, or lesions   LYMPH Nodes: No enlarged glands      PHYSICAL EXAM:  Vital Signs Last 24 Hrs  T(C): 37 (15 Sep 2021 10:14), Max: 37 (15 Sep 2021 10:14)  T(F): 98.6 (15 Sep 2021 10:14), Max: 98.6 (15 Sep 2021 10:14)  HR: 66 (15 Sep 2021 13:00) (57 - 80)  BP: 115/52 (15 Sep 2021 12:00) (99/48 - 128/60)  BP(mean): 71 (15 Sep 2021 12:00) (66 - 86)  RR: 22 (15 Sep 2021 13:00) (16 - 23)  SpO2: 96% (15 Sep 2021 13:00) (90% - 100%)  Daily     Daily     Constitutional: NAD	  HEENT:    PERRL, EOMI	  CVS:  S1 S2, No JVD,  No edema  Pulm: Lungs clear to auscultation	  GI:  Soft, Non-tender, + BS	  Ext: No LE edema  Neurologic: A&O x 3  Skin: No rash or lesion       	  LABS:	                         10.0   6.40  )-----------( 179      ( 15 Sep 2021 03:31 )             30.6     09-15    144  |  110<H>  |  17  ----------------------------<  103<H>  3.9   |  24  |  1.05    Ca    8.6      15 Sep 2021 03:31  Phos  4.0     09-15  Mg     2.4     09-15    TPro  6.2  /  Alb  3.8  /  TBili  0.4  /  DBili  x   /  AST  23  /  ALT  14  /  AlkPhos  64  09-15    proBNP:   Lipid Profile:   HgA1c:   TSH: 	      Ventricular Rate 60 BPM    Atrial Rate 60 BPM    P-R Interval 188 ms    QRS Duration 108 ms    Q-T Interval 442 ms    QTC Calculation(Bazett) 442 ms    P Axis 45 degrees    R Axis -36 degrees    T Axis -15 degrees    Diagnosis Line Demand pacemaker, interpretation is based on intrinsic rhythm  Sinus rhythm with occasional premature ventricular complexes  Left axis deviation  RSR' in V1  Poor R Wave Progression      Telemetry: sinus rhythm     Echo: < from: TTE Echo Complete w/ Contrast w/ Doppler (09.15.21 @ 10:25) >  1. Normal left and right ventricular size and systolic function.   2. Hyperdynamic left ventricular systolic function.   3. Normal right ventricular size and systolic function.   4. Biatrial enlargement.   5. 23 mm Jori 3 Ultra valve is seen in the aortic position with apparent normal function, without evidence of prosthetic dysfunction. There is noaortic regurgitation. The peak transvalvular velocity is 3.14 m/s, the mean transvalvular gradient is 20.10 mmHg, and the LVOT/AV velocity ratio is 0.60.   6. Mild mitral regurgitation.   7. Severe tricuspid regurgitation.   8. Pulmonary hypertension present, pulmonary artery systolic pressure is 54 mmHg.   9. No pericardial effusion.  10. No prior echo is available for comparison.

## 2021-09-15 NOTE — PHYSICAL THERAPY INITIAL EVALUATION ADULT - ADDITIONAL COMMENTS
Pt states she lives in private house with one flight of stairs to second floor. Pt owns RW and SC but denies any use of DME PTA. Pt was independent with amb and ADL's.

## 2021-09-15 NOTE — DISCHARGE NOTE PROVIDER - CARE PROVIDER_API CALL
Kelli Adames)  Cardiovascular Disease; Internal Medicine; Interventional Cardiology  [address]  [place] [address]  Phone: [phone]  Fax: [phone]  Follow Up Time:     Silverio Zuniga)  Cardiovascular Surgery  [address], [place] [address]  Phone: [phone]  Fax: [phone]  Follow Up Time:

## 2021-09-15 NOTE — PHYSICAL THERAPY INITIAL EVALUATION ADULT - GENERAL OBSERVATIONS, REHAB EVAL
Pt received OOB in chair +prima fit +hep lock +tele. PT received consent to treat from SELENA Morales. Pt is independent with amb and stair negotiation and is being d/c from IP PT services secondary to being at baseline.

## 2021-09-16 ENCOUNTER — APPOINTMENT (OUTPATIENT)
Dept: CARE COORDINATION | Facility: HOME HEALTH | Age: 81
End: 2021-09-16

## 2021-09-16 VITALS — RESPIRATION RATE: 15 BRPM

## 2021-09-16 DIAGNOSIS — I35.0 NONRHEUMATIC AORTIC (VALVE) STENOSIS: ICD-10-CM

## 2021-09-16 PROBLEM — E78.5 HYPERLIPIDEMIA, UNSPECIFIED: Chronic | Status: ACTIVE | Noted: 2021-09-14

## 2021-09-16 PROBLEM — I50.31 ACUTE DIASTOLIC (CONGESTIVE) HEART FAILURE: Chronic | Status: ACTIVE | Noted: 2021-09-14

## 2021-09-16 PROBLEM — I10 ESSENTIAL (PRIMARY) HYPERTENSION: Chronic | Status: ACTIVE | Noted: 2021-09-14

## 2021-09-16 PROBLEM — C64.9 MALIGNANT NEOPLASM OF UNSPECIFIED KIDNEY, EXCEPT RENAL PELVIS: Chronic | Status: ACTIVE | Noted: 2021-09-14

## 2021-09-16 RX ORDER — TRIAMTERENE AND HYDROCHLOROTHIAZIDE 37.5; 25 MG/1; MG/1
37.5-25 CAPSULE ORAL
Refills: 0 | Status: DISCONTINUED | COMMUNITY
End: 2021-09-16

## 2021-09-16 RX ORDER — ASPIRIN 81 MG/1
81 TABLET, COATED ORAL
Refills: 0 | Status: ACTIVE | COMMUNITY

## 2021-09-16 RX ORDER — ACETAMINOPHEN 325 MG/1
325 TABLET ORAL EVERY 6 HOURS
Refills: 0 | Status: ACTIVE | COMMUNITY
Start: 2021-09-16

## 2021-09-16 NOTE — REASON FOR VISIT
[Post Hospitalization] : a post hospitalization visit [Family Member] : family member [FreeTextEntry1] : post TAVR

## 2021-09-16 NOTE — PHYSICAL EXAM
[Sclera] : the sclera and conjunctiva were normal [Neck Appearance] : the appearance of the neck was normal [] : no respiratory distress [Exaggerated Use Of Accessory Muscles For Inspiration] : no accessory muscle use [Examination Of The Chest] : the chest was normal in appearance [Chest Visual Inspection Thoracic Asymmetry] : no chest asymmetry [Diminished Respiratory Excursion] : normal chest expansion [FreeTextEntry2] : b/l le chronic vascular changes [Abdomen Tenderness] : non-tender [FreeTextEntry1] : no distention visualized, normal BM reported [Abnormal Walk] : normal gait [Musculoskeletal - Swelling] : no joint swelling seen [Skin Color & Pigmentation] : normal skin color and pigmentation [Skin Turgor] : normal skin turgor [Sensation] : the sensory exam was normal to light touch and pinprick [Motor Exam] : the motor exam was normal [No Focal Deficits] : no focal deficits [Oriented To Time, Place, And Person] : oriented to person, place, and time [Affect] : the affect was normal [Impaired Insight] : insight and judgment were intact [Memory Recent] : recent memory was not impaired

## 2021-09-16 NOTE — ASSESSMENT
[FreeTextEntry1] : Pt recovering well at home s/p TAVR Reviewed all medications and dosages with pt and pt daughter understanding. Pt has all medications in home and is taking as prescribed. Pain controlled with current medication regimen. She denies cough, fever, palpitations, or worsening LE edema. Denies hematoma, or oozing from groin.  No further new symptoms, issues or concerns to report at this time.  Follow up appointments with CTU are scheduled. \par \par

## 2021-09-16 NOTE — ADDENDUM
[FreeTextEntry1] : s/p TAVR\par c/w medication regimen\par  Educated on showering daily with mild soap to all wounds\par  Instructed on signs and symptoms of wound infection \par  Educated to call CN with any issues related to wounds\par Educated on a heart healthy diet with No added salt\par Educated on importance of treating high blood pressure/managing diabetes/Psychosocial symptoms/reducing stress\par follow up with CTU/Cardiology/CTU\par

## 2021-09-16 NOTE — HISTORY OF PRESENT ILLNESS
[FreeTextEntry1] : Hospital Course: \par Discharge Date	15-Sep-2021 \par Admission Date	14-Sep-2021 08:58 \par Reason for Admission	Aortic stenosis \par Hospital Course copied and pasted:	 \par 80 y/o F with PMHx significant for HTN, HLD, prior gastric bypass, renal cell \par carcinoma with (s/p left nephrectomy) and chronic diastolic CHF with severe \par aortic stenosis with associated symptoms of decrease of daily activities and \par shortness of breath presented to this admission for intervention for her aortic \par valve disease. Patient presented today for TAVR. Her intraoperative course was \par uncomplicated and she was transferred directly to [facility] ICU with TVP in \par place. Pt requiring intermittent low dose ranjith overnight into POD1 for \par hypotension, asymptomatic. POD1 remained with TVP in place in the morning for \par sinus bradycardia. EP consulted and cleared patient for discharge. TVP removed \par from the groin, no issues, sat up no issues. Ambulated no issues. Per Dr. aki Aleman, Dr. Zuniga and Dr. Bailey, pt is medically ready to be discharged home. \par \par

## 2021-09-21 DIAGNOSIS — Z79.82 LONG TERM (CURRENT) USE OF ASPIRIN: ICD-10-CM

## 2021-09-21 DIAGNOSIS — Z88.8 ALLERGY STATUS TO OTHER DRUGS, MEDICAMENTS AND BIOLOGICAL SUBSTANCES: ICD-10-CM

## 2021-09-21 DIAGNOSIS — Z96.651 PRESENCE OF RIGHT ARTIFICIAL KNEE JOINT: ICD-10-CM

## 2021-09-21 DIAGNOSIS — Z90.5 ACQUIRED ABSENCE OF KIDNEY: ICD-10-CM

## 2021-09-21 DIAGNOSIS — I35.0 NONRHEUMATIC AORTIC (VALVE) STENOSIS: ICD-10-CM

## 2021-09-21 DIAGNOSIS — E78.5 HYPERLIPIDEMIA, UNSPECIFIED: ICD-10-CM

## 2021-09-21 DIAGNOSIS — I50.32 CHRONIC DIASTOLIC (CONGESTIVE) HEART FAILURE: ICD-10-CM

## 2021-09-21 DIAGNOSIS — I11.0 HYPERTENSIVE HEART DISEASE WITH HEART FAILURE: ICD-10-CM

## 2021-09-21 DIAGNOSIS — I95.9 HYPOTENSION, UNSPECIFIED: ICD-10-CM

## 2021-09-21 DIAGNOSIS — Z87.891 PERSONAL HISTORY OF NICOTINE DEPENDENCE: ICD-10-CM

## 2021-09-21 DIAGNOSIS — Z79.899 OTHER LONG TERM (CURRENT) DRUG THERAPY: ICD-10-CM

## 2021-09-21 DIAGNOSIS — Z00.6 ENCOUNTER FOR EXAMINATION FOR NORMAL COMPARISON AND CONTROL IN CLINICAL RESEARCH PROGRAM: ICD-10-CM

## 2021-09-21 DIAGNOSIS — Z85.528 PERSONAL HISTORY OF OTHER MALIGNANT NEOPLASM OF KIDNEY: ICD-10-CM

## 2021-09-21 DIAGNOSIS — Z98.84 BARIATRIC SURGERY STATUS: ICD-10-CM

## 2021-09-24 ENCOUNTER — NON-APPOINTMENT (OUTPATIENT)
Age: 81
End: 2021-09-24

## 2021-09-24 ENCOUNTER — APPOINTMENT (OUTPATIENT)
Dept: CARDIOTHORACIC SURGERY | Facility: CLINIC | Age: 81
End: 2021-09-24
Payer: MEDICARE

## 2021-09-24 VITALS
HEART RATE: 90 BPM | HEIGHT: 63 IN | BODY MASS INDEX: 38.27 KG/M2 | DIASTOLIC BLOOD PRESSURE: 79 MMHG | OXYGEN SATURATION: 95 % | WEIGHT: 216 LBS | SYSTOLIC BLOOD PRESSURE: 137 MMHG

## 2021-09-24 DIAGNOSIS — Z95.2 PRESENCE OF PROSTHETIC HEART VALVE: ICD-10-CM

## 2021-09-24 PROCEDURE — 99213 OFFICE O/P EST LOW 20 MIN: CPT

## 2021-09-28 PROBLEM — Z95.2 S/P TAVR (TRANSCATHETER AORTIC VALVE REPLACEMENT): Status: ACTIVE | Noted: 2021-09-28

## 2021-09-29 NOTE — PHYSICAL EXAM
[Neck Appearance] : the appearance of the neck was normal [Jugular Venous Distention Increased] : there was no jugular-venous distention [] : no respiratory distress [Respiration, Rhythm And Depth] : normal respiratory rhythm and effort [Exaggerated Use Of Accessory Muscles For Inspiration] : no accessory muscle use [Auscultation Breath Sounds / Voice Sounds] : lungs were clear to auscultation bilaterally [Apical Impulse] : the apical impulse was normal [Heart Rate And Rhythm] : heart rate was normal and rhythm regular [Heart Sounds] : normal S1 and S2 [Examination Of The Chest] : the chest was normal in appearance [Chest Visual Inspection Thoracic Asymmetry] : no chest asymmetry [Diminished Respiratory Excursion] : normal chest expansion [Bowel Sounds] : normal bowel sounds [Abdomen Soft] : soft [Abdomen Tenderness] : non-tender [Abnormal Walk] : normal gait [Skin Color & Pigmentation] : normal skin color and pigmentation [No Focal Deficits] : no focal deficits [Oriented To Time, Place, And Person] : oriented to person, place, and time [General Appearance - Alert] : alert [General Appearance - In No Acute Distress] : in no acute distress [FreeTextEntry1] : LOYDA best heard at the RSB. Right and left groin soft with no hematoma/bleeding. Distal pulses intact.

## 2021-09-29 NOTE — REVIEW OF SYSTEMS
[Negative] : Psychiatric [Heart Rate Is Slow] : the heart rate was not slow [Heart Rate Is Fast] : the heart rate was not fast [Chest Pain] : no chest pain [Palpitations] : no palpitations [Leg Claudication] : no intermittent leg claudication [Lower Ext Edema] : no lower extremity edema [Shortness Of Breath] : no shortness of breath [Wheezing] : no wheezing [Cough] : no cough [SOB on Exertion] : no shortness of breath during exertion [Orthopnea] : no orthopnea [PND] : no PND

## 2021-09-29 NOTE — HISTORY OF PRESENT ILLNESS
[FreeTextEntry1] : \par 81 year old female who is a former smoker with a history of HTN, hyperlipidemia, prior gastric bypass, renal cell carcinoma s/p left nephrectomy and chronic diastolic heart failure with severe aortic stenosis s/p transfemoral TAVR with Jori 3 Ultra (26mm) on 9/14/21 who presents for follow up after discharge. \par \par The patient tolerated the procedure well without complications. She was discharged home on POD #1. The patient has been feeling well since returning home and denies chest pain and SOB, at rest and with exertion. She also denies orthopnea, PND, dizziness, syncope, LE edema and palpitations.

## 2021-10-01 ENCOUNTER — TRANSCRIPTION ENCOUNTER (OUTPATIENT)
Age: 81
End: 2021-10-01

## 2021-10-04 ENCOUNTER — APPOINTMENT (OUTPATIENT)
Dept: HEART AND VASCULAR | Facility: CLINIC | Age: 81
End: 2021-10-04
Payer: MEDICARE

## 2021-10-04 PROCEDURE — 93228 REMOTE 30 DAY ECG REV/REPORT: CPT

## 2021-10-14 ENCOUNTER — TRANSCRIPTION ENCOUNTER (OUTPATIENT)
Age: 81
End: 2021-10-14

## 2021-10-22 ENCOUNTER — APPOINTMENT (OUTPATIENT)
Dept: CARDIOTHORACIC SURGERY | Facility: CLINIC | Age: 81
End: 2021-10-22

## 2021-10-26 PROCEDURE — 97162 PT EVAL MOD COMPLEX 30 MIN: CPT

## 2021-10-26 PROCEDURE — 86901 BLOOD TYPING SEROLOGIC RH(D): CPT

## 2021-10-26 PROCEDURE — 82962 GLUCOSE BLOOD TEST: CPT

## 2021-10-26 PROCEDURE — 84295 ASSAY OF SERUM SODIUM: CPT

## 2021-10-26 PROCEDURE — 36415 COLL VENOUS BLD VENIPUNCTURE: CPT

## 2021-10-26 PROCEDURE — C1769: CPT

## 2021-10-26 PROCEDURE — 86850 RBC ANTIBODY SCREEN: CPT

## 2021-10-26 PROCEDURE — 85610 PROTHROMBIN TIME: CPT

## 2021-10-26 PROCEDURE — C1887: CPT

## 2021-10-26 PROCEDURE — 82330 ASSAY OF CALCIUM: CPT

## 2021-10-26 PROCEDURE — 84100 ASSAY OF PHOSPHORUS: CPT

## 2021-10-26 PROCEDURE — C1760: CPT

## 2021-10-26 PROCEDURE — 85027 COMPLETE CBC AUTOMATED: CPT

## 2021-10-26 PROCEDURE — 86922 COMPATIBILITY TEST ANTIGLOB: CPT

## 2021-10-26 PROCEDURE — 71045 X-RAY EXAM CHEST 1 VIEW: CPT

## 2021-10-26 PROCEDURE — 86900 BLOOD TYPING SEROLOGIC ABO: CPT

## 2021-10-26 PROCEDURE — 86905 BLOOD TYPING RBC ANTIGENS: CPT

## 2021-10-26 PROCEDURE — 82803 BLOOD GASES ANY COMBINATION: CPT

## 2021-10-26 PROCEDURE — 93321 DOPPLER ECHO F-UP/LMTD STD: CPT

## 2021-10-26 PROCEDURE — C1894: CPT

## 2021-10-26 PROCEDURE — 97116 GAIT TRAINING THERAPY: CPT

## 2021-10-26 PROCEDURE — P9045: CPT

## 2021-10-26 PROCEDURE — 93005 ELECTROCARDIOGRAM TRACING: CPT

## 2021-10-26 PROCEDURE — 85730 THROMBOPLASTIN TIME PARTIAL: CPT

## 2021-10-26 PROCEDURE — C8929: CPT

## 2021-10-26 PROCEDURE — 84132 ASSAY OF SERUM POTASSIUM: CPT

## 2021-10-26 PROCEDURE — 83735 ASSAY OF MAGNESIUM: CPT

## 2021-10-26 PROCEDURE — 85025 COMPLETE CBC W/AUTO DIFF WBC: CPT

## 2021-10-26 PROCEDURE — 80053 COMPREHEN METABOLIC PANEL: CPT

## 2021-10-26 PROCEDURE — 86870 RBC ANTIBODY IDENTIFICATION: CPT

## 2021-10-26 PROCEDURE — 86880 COOMBS TEST DIRECT: CPT

## 2021-10-26 PROCEDURE — C1889: CPT

## 2024-09-20 NOTE — DISCHARGE NOTE NURSING/CASE MANAGEMENT/SOCIAL WORK - NSDCVIVACCINE_GEN_ALL_CORE_FT
Date of Service:  09/19/2024    LOCATION:  [facility]    HISTORY OF PRESENT ILLNESS:  This 83-year-old, who carries a diagnosis of what appears to be a 7 cm postmenopausal left ovarian cyst that was seen in prior imaging years prior and noted to be 2.9 cm, comes in today for treatment options and discussion.    OB HISTORY:  Four vaginal deliveries.  Last mammogram 20 years ago.  Last colonoscopy 11 of 22.  Diverticulosis and hemorrhoids.  Last Pap smear 20 years ago.    MEDICAL HISTORY:  Complicated.  Please refer to [facility].    SURGICAL HISTORY:  Complicated.  Please refer to [facility].    FAMILY HISTORY:  Negative for uterine, breast, colon, or ovarian cancer.    PHYSICAL EXAM:  Deferred at this time.  The patient notably in wheelchair and upon review of systems, complains of hemorrhoid pain.      Primary care doctor is Suzie Leyva.  Review of imaging including the CT shows unchanged 7 cm left ovarian cyst.  No overt suspicious imaging characteristics.    ASSESSMENT:  1. Non-symptomatic postmenopausal cystic 7 cm mass, may have been there prior.  2. Medically and surgically complicated.  3. The patient adamantly wants conservative management.  This will likely be repeat imaging in approximately 4 to 6 months and a CA-125 now and repeat later.  The patient understands that the only way we can know if something is cancerous, to remove it, but she adamantly does not want surgery, which is not unreasonable.  The risks and benefits were explained in regard to her decision making.  Approximately 30 minutes was spent with the patient going over the aforementioned, she seems to understand of this plan and agrees.  We will schedule for a CA-125 and repeat ultrasound as aforementioned.  In regard to her hemorrhoids, we will try and find her gastroenterologist in [place] to evaluate this and possibly treat her conservatively.  If she needs colorectal evaluation, unfortunately, this would have to be done at  No Vaccines Administered. [facility].        Dictated By:  Flaco Espino MD  Signing Provider:  Flaco Espino MD    SAK/AQT  D:  09/19/2024 02:02:07 PM  T:  09/19/2024 03:15:10 PM  Job:  [id number]

## 2024-12-31 ENCOUNTER — NON-APPOINTMENT (OUTPATIENT)
Age: 84
End: 2024-12-31

## 2025-08-09 ENCOUNTER — NON-APPOINTMENT (OUTPATIENT)
Age: 85
End: 2025-08-09